# Patient Record
Sex: MALE | Race: WHITE | NOT HISPANIC OR LATINO | Employment: UNEMPLOYED | URBAN - METROPOLITAN AREA
[De-identification: names, ages, dates, MRNs, and addresses within clinical notes are randomized per-mention and may not be internally consistent; named-entity substitution may affect disease eponyms.]

---

## 2019-07-03 ENCOUNTER — PES CALL (OUTPATIENT)
Dept: ADMINISTRATIVE | Facility: CLINIC | Age: 69
End: 2019-07-03

## 2023-06-05 ENCOUNTER — HOSPITAL ENCOUNTER (EMERGENCY)
Facility: HOSPITAL | Age: 73
Discharge: HOME | End: 2023-06-05
Attending: EMERGENCY MEDICINE
Payer: MEDICARE

## 2023-06-05 VITALS
TEMPERATURE: 98 F | DIASTOLIC BLOOD PRESSURE: 97 MMHG | SYSTOLIC BLOOD PRESSURE: 160 MMHG | RESPIRATION RATE: 18 BRPM | WEIGHT: 226 LBS | HEIGHT: 69 IN | BODY MASS INDEX: 33.47 KG/M2 | OXYGEN SATURATION: 98 % | HEART RATE: 82 BPM

## 2023-06-05 DIAGNOSIS — I10 ASYMPTOMATIC HYPERTENSION: Primary | ICD-10-CM

## 2023-06-05 DIAGNOSIS — Z79.899 MEDICATION COURSE CHANGED: ICD-10-CM

## 2023-06-05 PROCEDURE — 99281 EMR DPT VST MAYX REQ PHY/QHP: CPT | Mod: ER

## 2023-06-05 RX ORDER — ALLOPURINOL 100 MG/1
100 TABLET ORAL DAILY
COMMUNITY

## 2023-06-05 RX ORDER — IRBESARTAN AND HYDROCHLOROTHIAZIDE 300; 12.5 MG/1; MG/1
1 TABLET, FILM COATED ORAL DAILY
COMMUNITY

## 2023-06-05 RX ORDER — TAMSULOSIN HYDROCHLORIDE 0.4 MG/1
CAPSULE ORAL DAILY
COMMUNITY

## 2023-06-06 NOTE — ED TRIAGE NOTES
Santo Farah, a 72 y.o. male presents to the ED w/ complaint of having high blood pressure today when home health nurse came by for visit.  He was told to come to ER but he claims he forgot to take his medication today and denies any chest pain, sob, headache or blurred vision.  He did take his medication at 6pm tonight.     Triage note:  Chief Complaint   Patient presents with    Hypertension     Pt had a home health check and the RN reported the patient had high BP and should be seen at the ER. Pt has hx of HTN and is compliant with medications. Has no complaints at this time (denies CP, HA, blurry vision, etc).     Review of patient's allergies indicates:  No Known Allergies  Past Medical History:   Diagnosis Date    Hypertension

## 2023-06-06 NOTE — ED PROVIDER NOTES
Encounter Date: 6/5/2023       History     Chief Complaint   Patient presents with    Hypertension     Pt had a home health check and the RN reported the patient had high BP and should be seen at the ER. Pt has hx of HTN and is compliant with medications. Has no complaints at this time (denies CP, HA, blurry vision, etc).     72 y.o. male with hypertension presents emergency department for evaluation of elevated blood pressure reading at home around 5:00 p.m. today.  Patient reports annual home health visit occurred today during which time blood pressure was checked and found to be 167/111 and 172/111 on repeat check.  Per chart review patient takes irbesartan 300 mg/hydrochlorothiazide 12.5 mg.  Patient reports usual compliance with this medication but states he missed his morning dose today.  He reports taking the dose around 5:45 p.m. today.  He denies chest pain, shortness of breath, nausea, vomiting, fatigue, decreased urination, headache, dizziness, vision disturbance or syncope.    The history is provided by the patient.   Review of patient's allergies indicates:  No Known Allergies  Past Medical History:   Diagnosis Date    Hypertension      Past Surgical History:   Procedure Laterality Date    BACK SURGERY       No family history on file.  Social History     Tobacco Use    Smoking status: Never   Substance Use Topics    Alcohol use: No    Drug use: No     Review of Systems   Constitutional:  Negative for activity change, appetite change and fever.   Respiratory:  Negative for cough and shortness of breath.    Cardiovascular:  Negative for chest pain and leg swelling.   Gastrointestinal:  Negative for constipation, diarrhea, nausea and vomiting.   Genitourinary:  Negative for decreased urine volume.   Musculoskeletal:  Positive for back pain (chronic, intermittent, currently resolved).   Neurological:  Negative for dizziness, syncope, weakness, light-headedness and headaches.     Physical Exam     Initial  Vitals [06/05/23 1917]   BP Pulse Resp Temp SpO2   (!) 168/101 82 18 98 °F (36.7 °C) 98 %      MAP       --         Physical Exam    Nursing note and vitals reviewed.  Constitutional: He appears well-developed and well-nourished. He is not diaphoretic. No distress.   HENT:   Head: Normocephalic and atraumatic.   Mouth/Throat: Oropharynx is clear and moist.   Eyes: Conjunctivae are normal.   Neck: Phonation normal. No stridor present.   Normal range of motion.  Cardiovascular:  Regular rhythm and intact distal pulses.           Pulmonary/Chest: Effort normal. No accessory muscle usage or stridor. No tachypnea. No respiratory distress.   Abdominal: He exhibits no distension. There is no abdominal tenderness.   Musculoskeletal:         General: No tenderness. Normal range of motion.      Cervical back: Normal range of motion.     Neurological: He is alert and oriented to person, place, and time. He has normal strength. Gait normal. GCS eye subscore is 4. GCS verbal subscore is 5. GCS motor subscore is 6.   Skin: Skin is warm and intact.   Psychiatric: He has a normal mood and affect.       ED Course   Procedures  Labs Reviewed - No data to display       Imaging Results    None          Medications - No data to display                           Clinical Impression:   Final diagnoses:  [I10] Asymptomatic hypertension (Primary)  [Z79.899] Medication course changed - missed morning dose of BP medication today        ED Disposition Condition    Discharge Stable          ED Prescriptions    None       Follow-up Information       Follow up With Specialties Details Why Contact Info    The nearest emergency department.  Go to  As needed, If symptoms worsen     Your PCP  Call in 1 day to schedule an appointment, for re-evaluation of today's complaint, and ongoing care              Darell Lowe MD  07/06/23 0923

## 2023-06-06 NOTE — DISCHARGE INSTRUCTIONS
Check blood pressure daily and keep log to take to your next primary care appointment.  Avoid caffeine intake while symptoms persist.

## 2023-11-14 ENCOUNTER — HOSPITAL ENCOUNTER (EMERGENCY)
Facility: HOSPITAL | Age: 73
Discharge: HOME | End: 2023-11-14
Attending: EMERGENCY MEDICINE
Payer: MEDICARE

## 2023-11-14 VITALS
DIASTOLIC BLOOD PRESSURE: 90 MMHG | TEMPERATURE: 99 F | HEIGHT: 69 IN | RESPIRATION RATE: 18 BRPM | OXYGEN SATURATION: 97 % | SYSTOLIC BLOOD PRESSURE: 176 MMHG | WEIGHT: 226 LBS | HEART RATE: 104 BPM | BODY MASS INDEX: 33.47 KG/M2

## 2023-11-14 DIAGNOSIS — M25.50 POLYARTHRALGIA: Primary | ICD-10-CM

## 2023-11-14 PROCEDURE — 63600175 PHARM REV CODE 636 W HCPCS: Mod: ER | Performed by: EMERGENCY MEDICINE

## 2023-11-14 PROCEDURE — 25000003 PHARM REV CODE 250: Mod: ER | Performed by: EMERGENCY MEDICINE

## 2023-11-14 PROCEDURE — 96372 THER/PROPH/DIAG INJ SC/IM: CPT | Performed by: EMERGENCY MEDICINE

## 2023-11-14 PROCEDURE — 99284 EMERGENCY DEPT VISIT MOD MDM: CPT | Mod: ER

## 2023-11-14 RX ORDER — DEXAMETHASONE SODIUM PHOSPHATE 4 MG/ML
8 INJECTION, SOLUTION INTRA-ARTICULAR; INTRALESIONAL; INTRAMUSCULAR; INTRAVENOUS; SOFT TISSUE
Status: COMPLETED | OUTPATIENT
Start: 2023-11-14 | End: 2023-11-14

## 2023-11-14 RX ORDER — KETOROLAC TROMETHAMINE 30 MG/ML
15 INJECTION, SOLUTION INTRAMUSCULAR; INTRAVENOUS
Status: COMPLETED | OUTPATIENT
Start: 2023-11-14 | End: 2023-11-14

## 2023-11-14 RX ORDER — HYDROCODONE BITARTRATE AND ACETAMINOPHEN 5; 325 MG/1; MG/1
1 TABLET ORAL EVERY 6 HOURS PRN
Qty: 12 TABLET | Refills: 0 | Status: SHIPPED | OUTPATIENT
Start: 2023-11-14

## 2023-11-14 RX ORDER — HYDROCODONE BITARTRATE AND ACETAMINOPHEN 5; 325 MG/1; MG/1
1 TABLET ORAL
Status: COMPLETED | OUTPATIENT
Start: 2023-11-14 | End: 2023-11-14

## 2023-11-14 RX ORDER — DICLOFENAC SODIUM 75 MG/1
75 TABLET, DELAYED RELEASE ORAL 2 TIMES DAILY
Qty: 60 TABLET | Refills: 0 | Status: SHIPPED | OUTPATIENT
Start: 2023-11-14

## 2023-11-14 RX ADMIN — KETOROLAC TROMETHAMINE 15 MG: 30 INJECTION, SOLUTION INTRAMUSCULAR at 10:11

## 2023-11-14 RX ADMIN — DEXAMETHASONE SODIUM PHOSPHATE 8 MG: 4 INJECTION INTRA-ARTICULAR; INTRALESIONAL; INTRAMUSCULAR; INTRAVENOUS; SOFT TISSUE at 10:11

## 2023-11-14 RX ADMIN — HYDROCODONE BITARTRATE AND ACETAMINOPHEN 1 TABLET: 5; 325 TABLET ORAL at 10:11

## 2023-11-15 NOTE — ED PROVIDER NOTES
Encounter Date: 11/14/2023       History     Chief Complaint   Patient presents with    Joint Pain     Pt c/o pain in R wrist, both shoulders, both knees, and both ankles since yesterday similar to gout flare ups in the past     HPI  72 y.o.   Atraumatic bilateral shoulder pain, R wrist pain, knee pain, and ankle pain x 1 day  H/o gout  Denies h/o other arthritis  No fevers, uri sx  No eye sx  No dysuria  No falls, trauma    Review of patient's allergies indicates:  No Known Allergies  Past Medical History:   Diagnosis Date    Gout, unspecified     Hypertension      Past Surgical History:   Procedure Laterality Date    BACK SURGERY       History reviewed. No pertinent family history.  Social History     Tobacco Use    Smoking status: Never   Substance Use Topics    Alcohol use: No    Drug use: No     Review of Systems  All systems were reviewed/examined and were negative except as noted in the HPI.    Physical Exam     Initial Vitals [11/14/23 2219]   BP Pulse Resp Temp SpO2   (!) 163/99 105 20 98.5 °F (36.9 °C) 97 %      MAP       --         Physical Exam    General: the patient is awake, alert, and in no apparent distress.  Head: normocephalic and atraumatic, sclera are clear  Neck: supple without meningismus  Chest:  no respiratory distress  Heart: regular rate and rhythm  Extremities: warm and well perfused    Able to range shoulders, R wrist, bl knees, bl ankles  No signs of trauma nor infection/inflammation  Ext nvi  Skin: warm and dry  Psych conversant  Neuro: awake, alert, moving all extremities    ED Course   Procedures  Labs Reviewed - No data to display       Imaging Results    None          Medications   ketorolac injection 15 mg (15 mg Intramuscular Given 11/14/23 2248)   dexAMETHasone injection 8 mg (8 mg Intramuscular Given 11/14/23 2249)   HYDROcodone-acetaminophen 5-325 mg per tablet 1 tablet (1 tablet Oral Given 11/14/23 2245)     Medical Decision Making  Risk  Prescription drug  management.    Medical Decision Making:    This is an emergent evaluation of a patient presenting to the ED.  Nursing notes were reviewed.  DDX sprain versus arthritis OA vs inflammatory  I decided to obtain and review old medical records, which showed: h/o joint pain    Evaluation for Emergency Medical Condition  The patient received a medical screening exam and within a reasonable degree of clinical confidence an emergency medical condition has not been identified.  The patient is instructed on proper follow up and return precautions to the ED.    Sx tx  [facility]      Frederick Cruz MD, CELIO                             Clinical Impression:   Final diagnoses:  [M25.50] Polyarthralgia (Primary)        ED Disposition Condition    Discharge Stable          ED Prescriptions       Medication Sig Dispense Start Date End Date Auth. Provider    diclofenac (VOLTAREN) 75 MG EC tablet Take 1 tablet (75 mg total) by mouth 2 (two) times daily. 60 tablet 11/14/2023 -- Ej Cruz MD    HYDROcodone-acetaminophen (NORCO) 5-325 mg per tablet Take 1 tablet by mouth every 6 (six) hours as needed for Pain. 12 tablet 11/14/2023 -- Ej Cruz MD          Follow-up Information       Follow up With Specialties Details Why Contact Info    Your doctor  Schedule an appointment as soon as possible for a visit             Discharged to home in stable condition, return to ED warnings given, follow up and patient care instructions given.      Frederick Cruz MD, [facility], [facility]  Department of Emergency Medicine       Ej Cruz MD  11/15/23 [id number]

## 2023-11-15 NOTE — ED TRIAGE NOTES
Pt arrived to the ED via personal transport due to joint pain. Pt reports pain all over body, specifically in wrists, shoulders, knees, and ankles. Pt reports pain is 10/10. Hx of gout, reports taking allopurinol this morning with no relief. Ambulates independently. Alert and oriented x4. Family at the bedside.

## 2024-02-02 ENCOUNTER — HOSPITAL ENCOUNTER (EMERGENCY)
Facility: HOSPITAL | Age: 74
Discharge: HOME | End: 2024-02-02
Attending: EMERGENCY MEDICINE
Payer: MEDICARE

## 2024-02-02 VITALS
HEIGHT: 69 IN | SYSTOLIC BLOOD PRESSURE: 122 MMHG | HEART RATE: 79 BPM | OXYGEN SATURATION: 98 % | WEIGHT: 210 LBS | RESPIRATION RATE: 16 BRPM | BODY MASS INDEX: 31.1 KG/M2 | DIASTOLIC BLOOD PRESSURE: 79 MMHG | TEMPERATURE: 98 F

## 2024-02-02 DIAGNOSIS — R10.9 ABDOMINAL PAIN: Primary | ICD-10-CM

## 2024-02-02 DIAGNOSIS — M25.50 ARTHRALGIA, UNSPECIFIED JOINT: ICD-10-CM

## 2024-02-02 LAB
ALBUMIN SERPL-MCNC: 3.9 G/DL (ref 3.3–5.5)
ALBUMIN SERPL-MCNC: 4 G/DL (ref 3.3–5.5)
ALLENS TEST: ABNORMAL
ALP SERPL-CCNC: 87 U/L (ref 42–141)
ALP SERPL-CCNC: 88 U/L (ref 42–141)
BILIRUB SERPL-MCNC: 1.1 MG/DL (ref 0.2–1.6)
BILIRUB SERPL-MCNC: 1.1 MG/DL (ref 0.2–1.6)
BILIRUBIN, POC UA: NEGATIVE
BLOOD, POC UA: NEGATIVE
BUN SERPL-MCNC: 32 MG/DL (ref 7–22)
CALCIUM SERPL-MCNC: 8.8 MG/DL (ref 8–10.3)
CHLORIDE SERPL-SCNC: 106 MMOL/L (ref 98–108)
CLARITY, POC UA: CLEAR
COLOR, POC UA: YELLOW
CREAT SERPL-MCNC: 1.7 MG/DL (ref 0.6–1.2)
CTP QC/QA: YES
GLUCOSE SERPL-MCNC: 142 MG/DL (ref 73–118)
GLUCOSE, POC UA: NEGATIVE
HCO3 UR-SCNC: 21 MMOL/L (ref 24–28)
INFLUENZA A ANTIGEN, POC: NEGATIVE
INFLUENZA B ANTIGEN, POC: NEGATIVE
KETONES, POC UA: NEGATIVE
LDH SERPL L TO P-CCNC: 1.12 MMOL/L (ref 0.5–2.2)
LEUKOCYTE EST, POC UA: NEGATIVE
NITRITE, POC UA: NEGATIVE
PCO2 BLDA: 40.8 MMHG (ref 35–45)
PH SMN: 7.32 [PH] (ref 7.35–7.45)
PH UR STRIP: 5.5 [PH]
PO2 BLDA: 26 MMHG (ref 40–60)
POC ALT (SGPT): 12 U/L (ref 10–47)
POC ALT (SGPT): 29 U/L (ref 10–47)
POC AMYLASE: 23 U/L (ref 14–97)
POC AST (SGOT): 20 U/L (ref 11–38)
POC AST (SGOT): 23 U/L (ref 11–38)
POC B-TYPE NATRIURETIC PEPTIDE: 19.2 PG/ML (ref 0–100)
POC BE: -5 MMOL/L
POC CARDIAC TROPONIN I: 0.01 NG/ML (ref 0–0.08)
POC GGT: 43 U/L (ref 5–65)
POC PTINR: 1.4 (ref 0.9–1.2)
POC PTWBT: 16.5 SEC (ref 9.7–14.3)
POC SATURATED O2: 42 % (ref 95–100)
POC TCO2: 22 MMOL/L (ref 24–29)
POC TCO2: 24 MMOL/L (ref 18–33)
POTASSIUM BLD-SCNC: 4.8 MMOL/L (ref 3.6–5.1)
PROTEIN, POC UA: ABNORMAL
PROTEIN, POC: 7.8 G/DL (ref 6.4–8.1)
PROTEIN, POC: 8.1 G/DL (ref 6.4–8.1)
SAMPLE: ABNORMAL
SAMPLE: ABNORMAL
SAMPLE: NORMAL
SARS-COV-2 RDRP RESP QL NAA+PROBE: NEGATIVE
SITE: ABNORMAL
SODIUM BLD-SCNC: 139 MMOL/L (ref 128–145)
SPECIFIC GRAVITY, POC UA: 1.02
URATE SERPL-MCNC: 5.6 MG/DL (ref 3.4–7)
UROBILINOGEN, POC UA: 0.2 E.U./DL

## 2024-02-02 PROCEDURE — 93010 ELECTROCARDIOGRAM REPORT: CPT | Mod: ,,, | Performed by: INTERNAL MEDICINE

## 2024-02-02 PROCEDURE — 99285 EMERGENCY DEPT VISIT HI MDM: CPT | Mod: 25,ER

## 2024-02-02 PROCEDURE — 82803 BLOOD GASES ANY COMBINATION: CPT | Mod: ER

## 2024-02-02 PROCEDURE — 63600175 PHARM REV CODE 636 W HCPCS: Mod: ER | Performed by: EMERGENCY MEDICINE

## 2024-02-02 PROCEDURE — 96375 TX/PRO/DX INJ NEW DRUG ADDON: CPT | Mod: ER

## 2024-02-02 PROCEDURE — 96374 THER/PROPH/DIAG INJ IV PUSH: CPT | Mod: ER

## 2024-02-02 PROCEDURE — 84484 ASSAY OF TROPONIN QUANT: CPT | Mod: ER

## 2024-02-02 PROCEDURE — 25000003 PHARM REV CODE 250: Mod: ER | Performed by: EMERGENCY MEDICINE

## 2024-02-02 PROCEDURE — 93005 ELECTROCARDIOGRAM TRACING: CPT | Mod: ER

## 2024-02-02 PROCEDURE — 96361 HYDRATE IV INFUSION ADD-ON: CPT | Mod: ER

## 2024-02-02 PROCEDURE — 82040 ASSAY OF SERUM ALBUMIN: CPT | Mod: ER

## 2024-02-02 PROCEDURE — 84550 ASSAY OF BLOOD/URIC ACID: CPT | Performed by: EMERGENCY MEDICINE

## 2024-02-02 PROCEDURE — 63600175 PHARM REV CODE 636 W HCPCS: Mod: ER | Performed by: INTERNAL MEDICINE

## 2024-02-02 PROCEDURE — 87635 SARS-COV-2 COVID-19 AMP PRB: CPT | Mod: ER | Performed by: EMERGENCY MEDICINE

## 2024-02-02 PROCEDURE — 87804 INFLUENZA ASSAY W/OPTIC: CPT | Mod: ER

## 2024-02-02 RX ORDER — DEXAMETHASONE SODIUM PHOSPHATE 4 MG/ML
4 INJECTION, SOLUTION INTRA-ARTICULAR; INTRALESIONAL; INTRAMUSCULAR; INTRAVENOUS; SOFT TISSUE
Status: COMPLETED | OUTPATIENT
Start: 2024-02-02 | End: 2024-02-02

## 2024-02-02 RX ORDER — DIPHENHYDRAMINE HYDROCHLORIDE 50 MG/ML
25 INJECTION INTRAMUSCULAR; INTRAVENOUS
Status: COMPLETED | OUTPATIENT
Start: 2024-02-02 | End: 2024-02-02

## 2024-02-02 RX ORDER — METOCLOPRAMIDE HYDROCHLORIDE 5 MG/ML
10 INJECTION INTRAMUSCULAR; INTRAVENOUS
Status: COMPLETED | OUTPATIENT
Start: 2024-02-02 | End: 2024-02-02

## 2024-02-02 RX ADMIN — DEXAMETHASONE SODIUM PHOSPHATE 4 MG: 4 INJECTION INTRA-ARTICULAR; INTRALESIONAL; INTRAMUSCULAR; INTRAVENOUS; SOFT TISSUE at 06:02

## 2024-02-02 RX ADMIN — METOCLOPRAMIDE 10 MG: 5 INJECTION, SOLUTION INTRAMUSCULAR; INTRAVENOUS at 03:02

## 2024-02-02 RX ADMIN — SODIUM CHLORIDE 1000 ML: 9 INJECTION, SOLUTION INTRAVENOUS at 02:02

## 2024-02-02 RX ADMIN — DIPHENHYDRAMINE HYDROCHLORIDE 25 MG: 50 INJECTION, SOLUTION INTRAMUSCULAR; INTRAVENOUS at 03:02

## 2024-02-02 NOTE — ED PROVIDER NOTES
Encounter Date: 2/2/2024    SCRIBE #1 NOTE: I, Marlene Fairchild, am scribing for, and in the presence of,  Lori Blake DO.       History     Chief Complaint   Patient presents with    Abdominal Pain    Joint Pain     A 72 y/o male presents to the ER c/o generalized ABD pain accompanied with diarrhea and joint pain since last night. Pt reports taking Advil and Pepto without relief.      Santo Farah is a 73 y.o. male, with a PMHx of gout, HTN, chronic pain, who presents to the ED with a chief complaint of acute on chronic body aches, worsening last night. Patient reports BUE, BLE, bilateral knees, and bilateral shoulder pain that is worse than normal. Also reports associated abdominal pain (RLQ, LLQ, epigastric), nausea, diarrhea, fever, and chills. Attempted Tx w/ pepto bismol and advil w/o relief. Endorses compliance with regular medications. NKDA. Patient denies EtOH, tobacco, or illicit drug use. No known sick contact. No other exacerbating or alleviating factors. Denies CP, SOB, vomiting, rhinorrhea, cough, congestion, sore throat or other associated symptoms. Wife at bedside.      was used as patient speaks Bulgarian, Stephen #[id number].    The history is provided by the patient. A  was used.     Review of patient's allergies indicates:  No Known Allergies  Past Medical History:   Diagnosis Date    Gout, unspecified     Hypertension      Past Surgical History:   Procedure Laterality Date    BACK SURGERY       No family history on file.  Social History     Tobacco Use    Smoking status: Never   Substance Use Topics    Alcohol use: No    Drug use: No     Review of Systems   Constitutional:  Positive for chills and fever.   HENT:  Negative for congestion, rhinorrhea and sore throat.    Respiratory:  Negative for cough and shortness of breath.    Cardiovascular:  Negative for chest pain.   Gastrointestinal:  Positive for abdominal pain, diarrhea and nausea. Negative for vomiting.    All other systems reviewed and are negative.      Physical Exam     Initial Vitals [02/02/24 1303]   BP Pulse Resp Temp SpO2   120/69 99 18 97.9 °F (36.6 °C) 98 %      MAP       --         Physical Exam    Nursing note and vitals reviewed.  Constitutional: He appears well-developed and well-nourished.   HENT:   Head: Normocephalic and atraumatic.   Right Ear: External ear normal.   Left Ear: External ear normal.   Nose: Nose normal.   Mouth/Throat: Oropharynx is clear and moist.   Eyes: Conjunctivae and EOM are normal. Pupils are equal, round, and reactive to light.   Neck: Neck supple.   Normal range of motion.  Cardiovascular:  Normal rate, regular rhythm and normal heart sounds.     Exam reveals no gallop and no friction rub.       No murmur heard.  Pulmonary/Chest: Breath sounds normal. No respiratory distress. He has no wheezes. He has no rhonchi. He has no rales.   Abdominal: He exhibits distension. There is abdominal tenderness in the right lower quadrant and left lower quadrant.   Bowel sounds normal x4. There is no rebound and no guarding.   Musculoskeletal:         General: No tenderness or edema. Normal range of motion.      Cervical back: Normal range of motion and neck supple.      Right lower leg: No swelling. No edema.      Left lower leg: No swelling. No edema.      Comments: Extremities normal.      Neurological: He is alert and oriented to person, place, and time. No cranial nerve deficit.   Skin: Skin is warm and dry. Capillary refill takes less than 2 seconds. No rash noted.   Psychiatric: He has a normal mood and affect. His behavior is normal.       Patient gave consent to have physical exam performed.      ED Course   Procedures  Labs Reviewed   POCT URINALYSIS W/O SCOPE - Abnormal; Notable for the following components:       Result Value    Protein, UA Trace (*)     All other components within normal limits   POCT CMP - Abnormal; Notable for the following components:    POC BUN 32 (*)      POC Creatinine 1.7 (*)     POC Glucose 142 (*)     All other components within normal limits   ISTAT PROCEDURE - Abnormal; Notable for the following components:    POC PTWBT 16.5 (*)     POC PTINR 1.4 (*)     All other components within normal limits   ISTAT PROCEDURE - Abnormal; Notable for the following components:    POC PH 7.321 (*)     POC PO2 26 (*)     POC HCO3 21.0 (*)     POC BE -5 (*)     POC TCO2 22 (*)     All other components within normal limits   TROPONIN ISTAT   URIC ACID   POCT CBC   SARS-COV-2 RDRP GENE    Narrative:     This test utilizes isothermal nucleic acid amplification technology to detect the SARS-CoV-2 RdRp nucleic acid segment. The analytical sensitivity (limit of detection) is 500 copies/swab.     A POSITIVE result is indicative of the presence of SARS-CoV-2 RNA; clinical correlation with patient history and other diagnostic information is necessary to determine patient infection status.    A NEGATIVE result means that SARS-CoV-2 nucleic acids are not present above the limit of detection. A NEGATIVE result should be treated as presumptive. It does not rule out the possibility of COVID-19 and should not be the sole basis for treatment decisions. If COVID-19 is strongly suspected based on clinical and exposure history, re-testing using an alternate molecular assay should be considered.     Commercial kits are provided by [employer].   _________________________________________________________________   The authorized Fact Sheet for Healthcare Providers and the authorized Fact Sheet for Patients of the ID NOW COVID-19 are available on the FDA website:    https://www.fda.gov/media/070605/download      https://www.fda.gov/media/785454/download      POCT URINALYSIS(INSTRUMENT)   POCT INFLUENZA A/B MOLECULAR   POCT CMP   POCT LIVER PANEL   POCT TROPONIN   POCT B-TYPE NATRIURETIC PEPTIDE (BNP)   POCT PROTIME-INR   POCT LIVER PANEL   POCT B-TYPE NATRIURETIC PEPTIDE (BNP)   POCT RAPID  INFLUENZA A/B     EKG Readings: (Independently Interpreted)   No STEMI. Rate of 88. Normal Sinus Rhythm. Left Axis. Abnormal EKG. QTc at 440. No prior EKG for comparison.        Imaging Results              CT Abdomen Pelvis  Without Contrast (In process)  Result time 02/02/24 17:43:05                     X-ray chest AP portable (Final result)  Result time 02/02/24 14:42:24      Final result by Shubham Edward MD (02/02/24 14:42:24)                   Impression:      No acute chest disease identified.      Electronically signed by: Shubham Edward MD  Date:    02/02/2024  Time:    14:42               Narrative:    EXAMINATION:  XR CHEST AP PORTABLE    CLINICAL HISTORY:  Other (add clinical information to comments box below);    TECHNIQUE:  Single frontal view of the chest was performed.    COMPARISON:  None    FINDINGS:  The heart is not enlarged.  Superior mediastinal structures are unremarkable.  Pulmonary vasculature is within normal limits.  The lungs are symmetrically aerated and free of focal consolidations.  There is no evidence for pneumothorax or large pleural effusions.  Bony structures appear grossly intact.                                       Medications   sodium chloride 0.9% bolus 1,000 mL 1,000 mL (1,000 mLs Intravenous New Bag 2/2/24 1445)   diphenhydrAMINE injection 25 mg (25 mg Intravenous Given 2/2/24 1550)   metoclopramide injection 10 mg (10 mg Intravenous Given 2/2/24 1551)     Medical Decision Making  Amount and/or Complexity of Data Reviewed  External Data Reviewed: labs and notes.     Details: See MDM.   Labs: ordered.  Radiology: ordered.  ECG/medicine tests: ordered and independent interpretation performed. Decision-making details documented in ED Course.     Details: EKG independently interpreted by Dr. Blake reads: see ED course.       Risk  Prescription drug management.    Medical Decision Making:    This is an evaluation of a 73 y.o. male that presents to the Emergency Department  for body aches, joint pain, abdominal pain, nausea, diarrhea, fever, chills  Chief Complaint   Patient presents with    Abdominal Pain    Joint Pain     A 72 y/o male presents to the ER c/o generalized ABD pain accompanied with diarrhea and joint pain since last night. Pt reports taking Advil and Pepto without relief.          The patient is a non-toxic and well appearing patient. On physical exam, patient appears well hydrated with moist mucus membranes. Breath sounds are clear and equal bilaterally with no adventitious breath sounds, tachypnea or respiratory distress. Regular rate and rhythm. No murmurs. RLQ, LLQ abdominal tenderness. Abdominal distension. Bowel sounds normal x4. No BLE swelling/edema. Extremities normal. Patient is tolerating PO without difficulty. Physical exam otherwise as above.     I have reviewed vital signs and nursing notes.   Vital Signs Are Reassuring.     Based on the patient's symptoms, I am considering and evaluating for the following differential diagnoses: diverticulitis, diverticulosis, gastritis, gastroenteritis, UTI, renal failure, dehydration, viral illness, influenza A, influenza B, COVID-19    Consider hospitalization for:  Abdominal pain    Patient is agreeable to transfer and admission to [facility] if necessary    ED Course:Treatment in the ED included Physical Exam and medications given in ED  Medications   sodium chloride 0.9% bolus 1,000 mL 1,000 mL (1,000 mLs Intravenous New Bag 2/2/24 1445)   diphenhydrAMINE injection 25 mg (25 mg Intravenous Given 2/2/24 1550)   metoclopramide injection 10 mg (10 mg Intravenous Given 2/2/24 1551)   Per chart review, per labs on 1/2/2024 patient's BUN was 20 mg/dL and creatinine was 1.34 mg/dL.   Patient reports feeling better after treatment in the ER.       External Data/Documents Reviewed: Previous medical records and vital signs reviewed, see HPI and Physical exam.   Labs: ordered and reviewed.  COVID-19 negative.   Uric acid within normal limits at 5.6  Radiology: ordered as indicated and reviewed.  No pneumothorax  ECG/medicine tests: ordered and independent interpretation performed by Dr. Lori Blake DO. Decision-making details documented in ED Course.   Cardiac monitor placed for abdominal pain. Monitor shows Normal Sinus Rhythm. Interpreted by Dr. Lori Blake DO.    Risk  Diagnosis or treatment significantly limited by the following social determinants of health: Body mass index is 31.01 kg/m².       Turn care patient over to Dr. Garcia.  We discussed patient's presentation, vital signs, ED course, treatment, pending re-evaluation, pending labs and pending radiology reports.        At time of turnover patient is awake alert oriented x4 speaking clearly in full sentences and moving all 4 extremities.     The following labs and imaging were reviewed:    Insert CBC here     Admission on 02/02/2024   Component Date Value Ref Range Status    Albumin, POC 02/02/2024 4.0  3.3 - 5.5 g/dL Final    Alkaline Phosphatase, POC 02/02/2024 87  42 - 141 U/L Final    ALT (SGPT), POC 02/02/2024 29  10 - 47 U/L Final    AST (SGOT), POC 02/02/2024 23  11 - 38 U/L Final    POC BUN 02/02/2024 32 (H)  7 - 22 mg/dL Final    Calcium, POC 02/02/2024 8.8  8.0 - 10.3 mg/dL Final    POC Chloride 02/02/2024 106  98 - 108 mmol/L Final    POC Creatinine 02/02/2024 1.7 (H)  0.6 - 1.2 mg/dL Final    POC Glucose 02/02/2024 142 (H)  73 - 118 mg/dL Final    POC Potassium 02/02/2024 4.8  3.6 - 5.1 mmol/L Final    POC Sodium 02/02/2024 139  128 - 145 mmol/L Final    Bilirubin, POC 02/02/2024 1.1  0.2 - 1.6 mg/dL Final    POC TCO2 02/02/2024 24  18 - 33 mmol/L Final    Protein, POC 02/02/2024 7.8  6.4 - 8.1 g/dL Final    Albumin, POC 02/02/2024 3.9  3.3 - 5.5 g/dL Final    Alkaline Phosphatase, POC 02/02/2024 88  42 - 141 U/L Final    ALT (SGPT), POC 02/02/2024 12  10 - 47 U/L Final    Amylase, POC 02/02/2024 23  14 - 97 U/L Final    AST (SGOT), POC 02/02/2024  20  11 - 38 U/L Final    POC GGT 02/02/2024 43  5 - 65 U/L Final    Bilirubin, POC 02/02/2024 1.1  0.2 - 1.6 mg/dL Final    Protein, POC 02/02/2024 8.1  6.4 - 8.1 g/dL Final    POC PTWBT 02/02/2024 16.5 (H)  9.7 - 14.3 sec Final    POC PTINR 02/02/2024 1.4 (H)  0.9 - 1.2 Final    Sample 02/02/2024 unknown   Final    POC Cardiac Troponin I 02/02/2024 0.01  0.00 - 0.08 ng/mL Final    Sample 02/02/2024 unknown   Final    Comment: A single negative troponin is insufficient to rule out myocardial infarction.  The use of a serial sampling protocol is recommended practice. Correlate results with reference intervals established for methodology used. Point of care and core laboratory   troponin results are not interchangeable.      POC B-Type Natriuretic Peptide 02/02/2024 19.2  0.0 - 100.0 pg/mL Final    POC Rapid COVID 02/02/2024 Negative  Negative Final     Acceptable 02/02/2024 Yes   Final    Uric Acid 02/02/2024 5.6  3.4 - 7.0 mg/dL Final    Glucose, UA 02/02/2024 Negative   Final    Bilirubin, UA 02/02/2024 Negative   Final    Ketones, UA 02/02/2024 Negative   Final    Spec Grav UA 02/02/2024 1.020   Final    Blood, UA 02/02/2024 Negative   Final    PH, UA 02/02/2024 5.5   Final    Protein, UA 02/02/2024 Trace (A)   Final    Urobilinogen, UA 02/02/2024 0.2  E.U./dL Final    Nitrite, UA 02/02/2024 Negative   Final    Leukocytes, UA 02/02/2024 Negative   Final    Color, UA 02/02/2024 Yellow   Final    Clarity, UA 02/02/2024 Clear   Final    POC PH 02/02/2024 7.321 (L)  7.35 - 7.45 Final    POC PCO2 02/02/2024 40.8  35 - 45 mmHg Final    POC PO2 02/02/2024 26 (LL)  40 - 60 mmHg Final    POC HCO3 02/02/2024 21.0 (L)  24 - 28 mmol/L Final    POC BE 02/02/2024 -5 (L)  -2 to 2 mmol/L Final    POC SATURATED O2 02/02/2024 42  95 - 100 % Final    POC Lactate 02/02/2024 1.12  0.5 - 2.2 mmol/L Final    POC TCO2 02/02/2024 22 (L)  24 - 29 mmol/L Final    Sample 02/02/2024 VENOUS   Final    Site 02/02/2024 Other    Final    Allens Test 02/02/2024 N/A   Final    Influenza B Ag 02/02/2024 negative  Positive/Negative Final    Inflenza A Ag 02/02/2024 negative  Positive/Negative Final        Imaging Results              CT Abdomen Pelvis  Without Contrast (In process)  Result time 02/02/24 17:43:05                     X-ray chest AP portable (Final result)  Result time 02/02/24 14:42:24      Final result by Shubham Edward MD (02/02/24 14:42:24)                   Impression:      No acute chest disease identified.      Electronically signed by: hSubham Edward MD  Date:    02/02/2024  Time:    14:42               Narrative:    EXAMINATION:  XR CHEST AP PORTABLE    CLINICAL HISTORY:  Other (add clinical information to comments box below);    TECHNIQUE:  Single frontal view of the chest was performed.    COMPARISON:  None    FINDINGS:  The heart is not enlarged.  Superior mediastinal structures are unremarkable.  Pulmonary vasculature is within normal limits.  The lungs are symmetrically aerated and free of focal consolidations.  There is no evidence for pneumothorax or large pleural effusions.  Bony structures appear grossly intact.                                            Scribe Attestation:   Scribe #1: I performed the above scribed service and the documentation accurately describes the services I performed. I attest to the accuracy of the note.                          I, Dr. Lori Blake, personally performed the services described in this documentation. This document was produced by a scribe under my direction and in my presence. All medical record entries made by the scribe were at my direction and in my presence.  I have reviewed the chart and agree that the record reflects my personal performance and is accurate and complete. Lori Blake DO.     02/02/2024 6:03 PM        Clinical Impression:  Final diagnoses:  [R10.9] Abdominal pain                 Lori Blake DO  02/02/24 [id number]

## 2024-02-03 NOTE — PROVIDER PROGRESS NOTES - EMERGENCY DEPT.
Encounter Date: 2/2/2024    ED Physician Progress Notes        Physician Note:   Care of this patient was transferred to me (Dr. Garcia) at shift change from Dr. Blake pending CT abdomen/pelvis results, reassessment and disposition.  CT abdomen and pelvis shows no acute abnormalities.  Of note were pan colonic diverticulosis without diverticulitis or obstruction.  There was also a moderate sized hiatal hernia and lumbar stenosis.  Instructions for colitis were given and patient was advised to follow-up with his primary care physician within the next week for re-evaluation/return to the emergency department if condition worsens.